# Patient Record
Sex: MALE | Race: ASIAN | NOT HISPANIC OR LATINO | Employment: FULL TIME | URBAN - METROPOLITAN AREA
[De-identification: names, ages, dates, MRNs, and addresses within clinical notes are randomized per-mention and may not be internally consistent; named-entity substitution may affect disease eponyms.]

---

## 2019-06-24 ENCOUNTER — OFFICE VISIT (OUTPATIENT)
Dept: PRIMARY CARE CLINIC | Facility: CLINIC | Age: 54
End: 2019-06-24
Attending: NURSE PRACTITIONER
Payer: MEDICAID

## 2019-06-24 VITALS
DIASTOLIC BLOOD PRESSURE: 80 MMHG | HEART RATE: 65 BPM | SYSTOLIC BLOOD PRESSURE: 169 MMHG | RESPIRATION RATE: 18 BRPM | BODY MASS INDEX: 17.93 KG/M2 | OXYGEN SATURATION: 100 % | WEIGHT: 105 LBS | TEMPERATURE: 98 F | HEIGHT: 64 IN

## 2019-06-24 DIAGNOSIS — R00.1 BRADYCARDIA WITH 51-60 BEATS PER MINUTE: ICD-10-CM

## 2019-06-24 DIAGNOSIS — R06.02 SHORTNESS OF BREATH: ICD-10-CM

## 2019-06-24 DIAGNOSIS — I10 UNCONTROLLED HYPERTENSION: ICD-10-CM

## 2019-06-24 DIAGNOSIS — R07.89 MID STERNAL CHEST PAIN: Primary | ICD-10-CM

## 2019-06-24 PROCEDURE — 99203 OFFICE O/P NEW LOW 30 MIN: CPT | Mod: PBBFAC,PN,25 | Performed by: NURSE PRACTITIONER

## 2019-06-24 PROCEDURE — 93010 EKG 12-LEAD: ICD-10-PCS | Mod: S$PBB,,, | Performed by: INTERNAL MEDICINE

## 2019-06-24 PROCEDURE — 99203 PR OFFICE/OUTPT VISIT, NEW, LEVL III, 30-44 MIN: ICD-10-PCS | Mod: S$PBB,,, | Performed by: NURSE PRACTITIONER

## 2019-06-24 PROCEDURE — 93005 ELECTROCARDIOGRAM TRACING: CPT | Mod: PBBFAC,PN | Performed by: INTERNAL MEDICINE

## 2019-06-24 PROCEDURE — 93010 ELECTROCARDIOGRAM REPORT: CPT | Mod: S$PBB,,, | Performed by: INTERNAL MEDICINE

## 2019-06-24 PROCEDURE — 99999 PR PBB SHADOW E&M-NEW PATIENT-LVL III: ICD-10-PCS | Mod: PBBFAC,,, | Performed by: NURSE PRACTITIONER

## 2019-06-24 PROCEDURE — 99203 OFFICE O/P NEW LOW 30 MIN: CPT | Mod: S$PBB,,, | Performed by: NURSE PRACTITIONER

## 2019-06-24 PROCEDURE — 99999 PR PBB SHADOW E&M-NEW PATIENT-LVL III: CPT | Mod: PBBFAC,,, | Performed by: NURSE PRACTITIONER

## 2019-06-24 RX ORDER — LOSARTAN POTASSIUM 50 MG/1
TABLET ORAL
Refills: 1 | COMMUNITY
Start: 2019-06-06

## 2019-06-24 RX ORDER — CLONIDINE 0.1 MG/D
PATCH TRANSDERMAL
Refills: 3 | COMMUNITY
Start: 2019-06-12

## 2019-06-24 RX ORDER — SODIUM CHLORIDE 9 MG/ML
INJECTION, SOLUTION INTRAVENOUS CONTINUOUS
Status: SHIPPED | OUTPATIENT
Start: 2019-06-24

## 2019-06-24 NOTE — PROGRESS NOTES
"Subjective:       Patient ID: Remedios Li is a 54 y.o. male.    Chief Complaint: Chest Pain (shortness of breath)    Patient is a 54 year old male who presents to clinic today with complaints of chest pain. He reports for the past 2 weeks he has been experiencing mid-sternal chest pain with shortness of breath and nausea.  The pain is heavy and occurs when he is working and is relieved after  "5 to 10 minutes of relaxing".  The pain is intermittent and radiates to his upper chest and jaw. He has noticed an increase of episodes and today at work he began to experience the pain and it was not relieved by rest.  His supervisor told him to come tot he clinic.  He works at [employer] for [employer]. He denies any other acute complaints.       He is new to this provider and has not been seen in clinic previously. He is Pashto and there is a language barrier when communicating. If spoken slowly patient does verbalize understanding of all questions.     Review of Systems   Constitutional: Positive for fatigue.   Respiratory: Positive for chest tightness and shortness of breath.    Cardiovascular: Positive for chest pain and palpitations. Negative for leg swelling.   Gastrointestinal: Positive for nausea.   All other systems reviewed and are negative.      Objective:      Physical Exam   Constitutional: He is oriented to person, place, and time. He appears well-developed and well-nourished.   Thin.  In no acute distress    HENT:   Head: Normocephalic and atraumatic.   Eyes: Right eye exhibits no discharge. Left eye exhibits no discharge. No scleral icterus.   Neck: Neck supple. No JVD present. No tracheal deviation present.   Cardiovascular: Regular rhythm, normal heart sounds and intact distal pulses.   Bradycardic.  Apical pulse 57   Abdominal: Soft. Bowel sounds are normal. He exhibits no distension. There is no tenderness.   Musculoskeletal: He exhibits no edema.   Neurological: He is alert and oriented to " person, place, and time.   Skin: Skin is warm and dry. He is not diaphoretic.   Psychiatric:   Cooperative but anxious    Nursing note and vitals reviewed.          Assessment:       1. Mid sternal chest pain    2. Shortness of breath    3. Bradycardia with 51-60 beats per minute    4. Uncontrolled hypertension        Plan:       Mid sternal chest pain    Shortness of breath    Bradycardia with 51-60 beats per minute    Uncontrolled hypertension     ECG completed and reveals no STEMI. Sinus bradycardia.  Sebastian end to cardiology for interpretation. IV started right arm first attempt with 20 guage jelco by NP.  Saline lock placed.  Flushed easily.  Normal saline 500 cc back hung at KVO for IV access.  EMS telephoned for ambulance transport to the ED for further evaluation and treatment.  Pt. Left clinic in stable condition with paramedics via stretcher for transport to ED.  Hospital to be taken to was undetermined when patient left clinic.        Pt. To follow up with his PCP after ED/hospital evaluation.